# Patient Record
Sex: MALE | Race: WHITE | Employment: UNEMPLOYED | URBAN - METROPOLITAN AREA
[De-identification: names, ages, dates, MRNs, and addresses within clinical notes are randomized per-mention and may not be internally consistent; named-entity substitution may affect disease eponyms.]

---

## 2020-01-04 ENCOUNTER — HOSPITAL ENCOUNTER (EMERGENCY)
Age: 20
Discharge: HOME | End: 2020-01-04
Attending: EMERGENCY MEDICINE
Payer: COMMERCIAL

## 2020-01-04 ENCOUNTER — APPOINTMENT (OUTPATIENT)
Dept: GENERAL RADIOLOGY | Age: 20
End: 2020-01-04
Payer: COMMERCIAL

## 2020-01-04 VITALS
HEART RATE: 70 BPM | HEIGHT: 69 IN | BODY MASS INDEX: 19.26 KG/M2 | WEIGHT: 130 LBS | SYSTOLIC BLOOD PRESSURE: 134 MMHG | DIASTOLIC BLOOD PRESSURE: 83 MMHG | RESPIRATION RATE: 15 BRPM | TEMPERATURE: 99.8 F | OXYGEN SATURATION: 99 %

## 2020-01-04 LAB
EKG ATRIAL RATE: 74 BPM
EKG DIAGNOSIS: NORMAL
EKG P AXIS: 77 DEGREES
EKG P-R INTERVAL: 132 MS
EKG Q-T INTERVAL: 396 MS
EKG QRS DURATION: 140 MS
EKG QTC CALCULATION (BAZETT): 439 MS
EKG R AXIS: 36 DEGREES
EKG T AXIS: 41 DEGREES
EKG VENTRICULAR RATE: 74 BPM

## 2020-01-04 PROCEDURE — 99285 EMERGENCY DEPT VISIT HI MDM: CPT

## 2020-01-04 PROCEDURE — 6370000000 HC RX 637 (ALT 250 FOR IP): Performed by: EMERGENCY MEDICINE

## 2020-01-04 PROCEDURE — 71046 X-RAY EXAM CHEST 2 VIEWS: CPT

## 2020-01-04 PROCEDURE — 93010 ELECTROCARDIOGRAM REPORT: CPT | Performed by: INTERNAL MEDICINE

## 2020-01-04 PROCEDURE — 93005 ELECTROCARDIOGRAM TRACING: CPT | Performed by: EMERGENCY MEDICINE

## 2020-01-04 PROCEDURE — 94640 AIRWAY INHALATION TREATMENT: CPT

## 2020-01-04 RX ORDER — IPRATROPIUM BROMIDE AND ALBUTEROL SULFATE 2.5; .5 MG/3ML; MG/3ML
1 SOLUTION RESPIRATORY (INHALATION) ONCE
Status: COMPLETED | OUTPATIENT
Start: 2020-01-04 | End: 2020-01-04

## 2020-01-04 RX ORDER — ALBUTEROL SULFATE 90 UG/1
2 AEROSOL, METERED RESPIRATORY (INHALATION) EVERY 4 HOURS PRN
Qty: 1 INHALER | Refills: 0 | Status: SHIPPED | OUTPATIENT
Start: 2020-01-04

## 2020-01-04 RX ORDER — PREDNISONE 20 MG/1
60 TABLET ORAL ONCE
Status: COMPLETED | OUTPATIENT
Start: 2020-01-04 | End: 2020-01-04

## 2020-01-04 RX ORDER — PREDNISONE 20 MG/1
60 TABLET ORAL DAILY
Qty: 12 TABLET | Refills: 0 | Status: SHIPPED | OUTPATIENT
Start: 2020-01-04 | End: 2020-01-08

## 2020-01-04 RX ADMIN — IPRATROPIUM BROMIDE AND ALBUTEROL SULFATE 1 AMPULE: .5; 3 SOLUTION RESPIRATORY (INHALATION) at 03:56

## 2020-01-04 RX ADMIN — PREDNISONE 60 MG: 20 TABLET ORAL at 03:40

## 2020-01-04 ASSESSMENT — ENCOUNTER SYMPTOMS
COLOR CHANGE: 0
TROUBLE SWALLOWING: 0
VOICE CHANGE: 0
PHOTOPHOBIA: 0
SHORTNESS OF BREATH: 1
ABDOMINAL PAIN: 0
STRIDOR: 0
BACK PAIN: 0
VOMITING: 0
FACIAL SWELLING: 0
WHEEZING: 0
NAUSEA: 0
BLOOD IN STOOL: 0

## 2020-01-04 ASSESSMENT — PAIN SCALES - GENERAL: PAINLEVEL_OUTOF10: 2

## 2020-01-04 ASSESSMENT — PAIN DESCRIPTION - ORIENTATION: ORIENTATION: LEFT

## 2020-01-04 ASSESSMENT — PAIN DESCRIPTION - LOCATION: LOCATION: CHEST

## 2020-01-04 NOTE — ED PROVIDER NOTES
difficulty. Psychiatric/Behavioral: Negative for self-injury and suicidal ideas. Except as noted above the remainder of the review of systems was reviewed and negative. PAST MEDICAL HISTORY     Past Medical History:   Diagnosis Date    Asthma     Bundle branch block, right          SURGICAL HISTORY     History reviewed. No pertinent surgical history. CURRENT MEDICATIONS       Previous Medications    No medications on file       ALLERGIES     Patient has no known allergies. FAMILY HISTORY     History reviewed. No pertinent family history.        SOCIAL HISTORY       Social History     Socioeconomic History    Marital status: Single     Spouse name: None    Number of children: None    Years of education: None    Highest education level: None   Occupational History    None   Social Needs    Financial resource strain: None    Food insecurity:     Worry: None     Inability: None    Transportation needs:     Medical: None     Non-medical: None   Tobacco Use    Smoking status: Current Every Day Smoker     Packs/day: 0.50     Types: Cigarettes    Smokeless tobacco: Never Used   Substance and Sexual Activity    Alcohol use: No    Drug use: No    Sexual activity: Yes     Partners: Female   Lifestyle    Physical activity:     Days per week: None     Minutes per session: None    Stress: None   Relationships    Social connections:     Talks on phone: None     Gets together: None     Attends Denominational service: None     Active member of club or organization: None     Attends meetings of clubs or organizations: None     Relationship status: None    Intimate partner violence:     Fear of current or ex partner: None     Emotionally abused: None     Physically abused: None     Forced sexual activity: None   Other Topics Concern    None   Social History Narrative    None         PHYSICAL EXAM    (up to 7 for level 4, 8 or more for level 5)     ED Triage Vitals [01/04/20 0303]   BP Temp Temp available at the time of this note:    XR CHEST STANDARD (2 VW)   Final Result   No acute disease. LABS:  Labs Reviewed   CBC WITH AUTO DIFFERENTIAL   BASIC METABOLIC PANEL   D-DIMER, QUANTITATIVE       All otherlabs were within normal range or not returned as of this dictation. EMERGENCY DEPARTMENT COURSE and DIFFERENTIAL DIAGNOSIS/MDM:   Vitals:    Vitals:    01/04/20 0303   BP: (!) 153/90   Pulse: 120   Resp: 16   Temp: 99.8 °F (37.7 °C)   TempSrc: Oral   SpO2: 99%   Weight: 130 lb (59 kg)   Height: 5' 9\" (1.753 m)         MDM  Patient is tachycardic and does report pleuritic chest pain therefore I have discussed pulmonary embolism (blood clot in the lungs) as a concern with the patient and his mother. I have strongly recommended d-dimer and then CTA chest if the d-dimer is concerning. The patient outright refuses any blood draws and he is scared of needles, only wants a chest x-ray, and refuses any blood work. I had a very renato conversation with him about the risks, benefits, alternatives of this pursuing a work-up for pulmonary embolism and I feel the patient adequately understands the risks, benefits, alternatives. While the patient's mother does want him to obtain the blood work she does understand that he is an adult, is sober, and understands the risks, and benefits, and therefore is able to make his own decisions. The patient's chest x-ray does not show any acute cardiopulmonary pathology and he report substantial improvement after receiving prednisone and 1 breathing treatment. Repeat lung exam shows improved air movement bilaterally and the patient states that he milligrams any pain when taking a deep breath. I have again asked him to undergo work-up for pulmonary embolism and the patient again refuses. I also explained that this test would reveal any damage to his heart however the patient continues to refuse any blood work.   I feel he is appropriate for discharge home with

## 2020-01-04 NOTE — ED NOTES
Patient re educated on risk and benefits of blood draw and need for IV to do additional testing. Patient declined. Patient reports he does not want a blood draw. Patient is alert and oriented. Mother is at bedside and encouraged patient to have testing.  Patient verbalized understanding of discharge instructions and follow up care with medication administration     Jennifer Mora RN  01/04/20 1044 01-Jan-2020 12:40

## 2020-01-07 ENCOUNTER — OFFICE VISIT (OUTPATIENT)
Dept: CARDIOLOGY CLINIC | Age: 20
End: 2020-01-07
Payer: COMMERCIAL

## 2020-01-07 VITALS
HEART RATE: 82 BPM | DIASTOLIC BLOOD PRESSURE: 72 MMHG | HEIGHT: 69 IN | OXYGEN SATURATION: 98 % | BODY MASS INDEX: 17.89 KG/M2 | SYSTOLIC BLOOD PRESSURE: 118 MMHG | WEIGHT: 120.8 LBS

## 2020-01-07 PROBLEM — I45.10 RIGHT BUNDLE BRANCH BLOCK: Status: ACTIVE | Noted: 2020-01-07

## 2020-01-07 PROBLEM — R06.02 SHORTNESS OF BREATH: Status: ACTIVE | Noted: 2020-01-07

## 2020-01-07 PROBLEM — Z72.0 TOBACCO ABUSE: Status: ACTIVE | Noted: 2020-01-07

## 2020-01-07 PROBLEM — R07.2 PRECORDIAL PAIN: Status: ACTIVE | Noted: 2020-01-07

## 2020-01-07 PROCEDURE — 99244 OFF/OP CNSLTJ NEW/EST MOD 40: CPT | Performed by: INTERNAL MEDICINE

## 2020-01-07 PROCEDURE — G8484 FLU IMMUNIZE NO ADMIN: HCPCS | Performed by: INTERNAL MEDICINE

## 2020-01-07 PROCEDURE — G8427 DOCREV CUR MEDS BY ELIG CLIN: HCPCS | Performed by: INTERNAL MEDICINE

## 2020-01-07 PROCEDURE — G8419 CALC BMI OUT NRM PARAM NOF/U: HCPCS | Performed by: INTERNAL MEDICINE

## 2020-01-07 NOTE — PROGRESS NOTES
Sig Start Date End Date Taking? Authorizing Provider   predniSONE (DELTASONE) 20 MG tablet Take 3 tablets by mouth daily for 4 days 1/4/20 1/8/20 Yes Nicole Vazquez MD   albuterol sulfate HFA (PROVENTIL HFA) 108 (90 Base) MCG/ACT inhaler Inhale 2 puffs into the lungs every 4 hours as needed for Wheezing or Shortness of Breath (Space out to every 6 hours as symptoms improve) Space out to every 6 hours as symptoms improve. 1/4/20  Yes Nicole Vazquez MD       In-patient schedule medications:        Infusion Medications: Allergies:  Patient has no known allergies. Review of Systems:   All 14 point review of symptoms completed. Pertinent positives identified in the HPI, all other review of symptoms findings as below.      Review of Systems - History obtained from the patient  General ROS: negative for - chills, fever or night sweats  Psychological ROS: negative for - disorientation or hallucinations  Ophthalmic ROS: negative for - dry eyes, eye pain or loss of vision  ENT ROS: negative for - nasal discharge or sore throat  Allergy and Immunology ROS: negative for - hives or itchy/watery eyes  Hematological and Lymphatic ROS: negative for - jaundice or night sweats  Endocrine ROS: negative for - mood swings or temperature intolerance  Breast ROS: deferred  Respiratory ROS: negative for - hemoptysis or stridor  Gastrointestinal ROS: no abdominal pain, change in bowel habits, or black or bloody stools  Genito-Urinary ROS: no dysuria, trouble voiding, or hematuria  Musculoskeletal ROS: negative for - gait disturbance, joint pain or joint stiffness  Neurological ROS: negative for - seizures or speech problems  Dermatological ROS: negative for - rash or skin lesion changes      Physical Examination:    /72   Pulse 82   Ht 5' 9\" (1.753 m)   Wt 120 lb 12.8 oz (54.8 kg)   SpO2 98%   BMI 17.84 kg/m²   /72   Pulse 82   Ht 5' 9\" (1.753 m)   Wt 120 lb 12.8 oz (54.8 kg)   SpO2 98%   BMI 17.84 kg/m²    Weight: 120 lb 12.8 oz (54.8 kg)     Wt Readings from Last 3 Encounters:   01/07/20 120 lb 12.8 oz (54.8 kg)   01/04/20 130 lb (59 kg)   10/08/17 130 lb (59 kg) (17 %, Z= -0.94)*     * Growth percentiles are based on [facility] (Boys, 2-20 Years) data. No intake or output data in the 24 hours ending 01/07/20 1324    General Appearance:  Alert, cooperative, no distress, appears stated age   Head:  Normocephalic, without obvious abnormality, atraumatic   Eyes:  PERRL, conjunctiva/corneas clear       Nose: Nares normal, no drainage or sinus tenderness   Throat: Lips, mucosa, and tongue normal   Neck: Supple, symmetrical, trachea midline, no adenopathy, thyroid: not enlarged, symmetric, no tenderness/mass/nodules, no carotid bruit or JVD       Lungs:   Clear to auscultation bilaterally, respirations unlabored   Chest Wall:  No tenderness or deformity   Heart:  Regular rhythm and normal rate; S1, S2 are normal; no murmur noted; no rub or gallop   Abdomen:   Soft, non-tender, bowel sounds active all four quadrants,  no masses, no organomegaly           Extremities: Extremities normal, atraumatic, no cyanosis or edema   Pulses: 2+ and symmetric   Skin: Skin color, texture, turgor normal, no rashes or lesions   Pysch: Normal mood and affect   Neurologic: Normal gross motor and sensory exam.         Labs  No results for input(s): WBC, HGB, HCT, MCV, PLT in the last 72 hours. No results for input(s): CREATININE, BUN, NA, K, CL, CO2 in the last 72 hours. No results for input(s): INR, PROTIME in the last 72 hours. No results for input(s): TROPONINI in the last 72 hours. Invalid input(s): PRO-BNP  No results for input(s): CHOL, HDL in the last 72 hours. Invalid input(s): LDL, TG      Imaging:  I have reviewed the below testing personally and my interpretation is below.   EKG:  CXR:      Assessment:  20 y.o. patient with:  Problem List Items Addressed This Visit     Precordial pain    Shortness of breath    Right

## 2020-01-07 NOTE — PATIENT INSTRUCTIONS
Plan:  1. An echocardiogram will be ordered. This will allow review of the patient's left ventricular function and determine any valve abnormalities. The pericardium and other structures will also be evaluated. 2. Establish care with a primary care physician. 3. Referral to Pulmonology. 4. Importance of smoking cessation discussed. 5. Follow up with me after testing.

## 2020-01-16 ENCOUNTER — HOSPITAL ENCOUNTER (OUTPATIENT)
Dept: NON INVASIVE DIAGNOSTICS | Age: 20
Discharge: HOME | End: 2020-01-16
Payer: COMMERCIAL

## 2020-01-16 LAB
LV EF: 55 %
LVEF MODALITY: NORMAL

## 2020-01-16 PROCEDURE — 93306 TTE W/DOPPLER COMPLETE: CPT

## 2020-01-24 ENCOUNTER — TELEPHONE (OUTPATIENT)
Dept: CARDIOLOGY CLINIC | Age: 20
End: 2020-01-24

## 2020-01-24 NOTE — TELEPHONE ENCOUNTER
----- Message from Byron Faith MD sent at 1/24/2020 12:06 PM EST -----  The test is normal. Please call the patient and inform them of the normal result.

## 2020-01-27 ENCOUNTER — OFFICE VISIT (OUTPATIENT)
Dept: PULMONOLOGY | Age: 20
End: 2020-01-27
Payer: COMMERCIAL

## 2020-01-27 VITALS
BODY MASS INDEX: 18.07 KG/M2 | WEIGHT: 122 LBS | TEMPERATURE: 98 F | DIASTOLIC BLOOD PRESSURE: 90 MMHG | HEIGHT: 69 IN | RESPIRATION RATE: 16 BRPM | SYSTOLIC BLOOD PRESSURE: 138 MMHG | HEART RATE: 118 BPM | OXYGEN SATURATION: 98 %

## 2020-01-27 PROCEDURE — G8427 DOCREV CUR MEDS BY ELIG CLIN: HCPCS | Performed by: INTERNAL MEDICINE

## 2020-01-27 PROCEDURE — G8484 FLU IMMUNIZE NO ADMIN: HCPCS | Performed by: INTERNAL MEDICINE

## 2020-01-27 PROCEDURE — 99244 OFF/OP CNSLTJ NEW/EST MOD 40: CPT | Performed by: INTERNAL MEDICINE

## 2020-01-27 PROCEDURE — G8419 CALC BMI OUT NRM PARAM NOF/U: HCPCS | Performed by: INTERNAL MEDICINE

## 2020-01-27 RX ORDER — ALBUTEROL SULFATE 90 UG/1
2 AEROSOL, METERED RESPIRATORY (INHALATION) EVERY 4 HOURS PRN
Qty: 1 INHALER | Refills: 5 | Status: SHIPPED | OUTPATIENT
Start: 2020-01-27 | End: 2021-01-26

## 2020-01-28 ENCOUNTER — TELEPHONE (OUTPATIENT)
Dept: PULMONOLOGY | Age: 20
End: 2020-01-28

## 2020-01-30 NOTE — TELEPHONE ENCOUNTER
Insurance denied Vonna Searing but Advair is preferred. New script for Lizzie Salazarus is pending if you agree. OV 1/27/20:    ASSESSMENT:  · Shortness of breath   · Mild intermittent asthma, history of childhood asthma  · Ongoing tobacco abuse     PLAN:  · Full PFT and see me in 4-6 weeks  · Advair 250  · Albuterol PRN  · I did not see any findings on physical exam suggesting neurologic or vascular compromise of the left upper extremity.   I encouraged him to schedule with a regular PCP and/or neurologist if he has any additional concerns.

## 2020-01-30 NOTE — TELEPHONE ENCOUNTER
Your request has been denied   Unable to approve Sherlean Breslow powder for inhalation 250-50 mcg/dose due to unmet criteria (2) as outlined in the plan guideline below.  Member must try drug alternatives for a specific time frame per criteria number [2]: Dulera, Symbicort, Advair

## 2020-03-17 ENCOUNTER — TELEPHONE (OUTPATIENT)
Dept: PULMONOLOGY | Age: 20
End: 2020-03-17

## 2020-10-11 ENCOUNTER — HOSPITAL ENCOUNTER (EMERGENCY)
Age: 20
Discharge: HOME | End: 2020-10-11
Attending: EMERGENCY MEDICINE
Payer: COMMERCIAL

## 2020-10-11 VITALS
WEIGHT: 125 LBS | HEART RATE: 96 BPM | TEMPERATURE: 98.2 F | HEIGHT: 69 IN | DIASTOLIC BLOOD PRESSURE: 87 MMHG | RESPIRATION RATE: 16 BRPM | SYSTOLIC BLOOD PRESSURE: 135 MMHG | BODY MASS INDEX: 18.51 KG/M2 | OXYGEN SATURATION: 97 %

## 2020-10-11 PROCEDURE — 6370000000 HC RX 637 (ALT 250 FOR IP)

## 2020-10-11 PROCEDURE — 99282 EMERGENCY DEPT VISIT SF MDM: CPT

## 2020-10-11 RX ORDER — CHLORHEXIDINE GLUCONATE 0.12 MG/ML
15 RINSE ORAL 2 TIMES DAILY
Qty: 420 ML | Refills: 0 | Status: SHIPPED | OUTPATIENT
Start: 2020-10-11 | End: 2020-10-25

## 2020-10-11 RX ORDER — IBUPROFEN 600 MG/1
600 TABLET ORAL ONCE
Status: COMPLETED | OUTPATIENT
Start: 2020-10-11 | End: 2020-10-11

## 2020-10-11 RX ORDER — IBUPROFEN 600 MG/1
TABLET ORAL
Status: COMPLETED
Start: 2020-10-11 | End: 2020-10-11

## 2020-10-11 RX ORDER — PENICILLIN V POTASSIUM 500 MG/1
500 TABLET ORAL 4 TIMES DAILY
Qty: 28 TABLET | Refills: 0 | Status: SHIPPED | OUTPATIENT
Start: 2020-10-11 | End: 2020-10-18

## 2020-10-11 RX ADMIN — IBUPROFEN 600 MG: 600 TABLET ORAL at 10:42

## 2020-10-11 RX ADMIN — IBUPROFEN 600 MG: 600 TABLET, FILM COATED ORAL at 10:42

## 2020-10-11 ASSESSMENT — PAIN DESCRIPTION - ORIENTATION: ORIENTATION: LEFT;LOWER

## 2020-10-11 ASSESSMENT — PAIN DESCRIPTION - PAIN TYPE
TYPE: CHRONIC PAIN
TYPE: ACUTE PAIN

## 2020-10-11 ASSESSMENT — PAIN SCALES - GENERAL
PAINLEVEL_OUTOF10: 7

## 2020-10-11 ASSESSMENT — PAIN DESCRIPTION - LOCATION
LOCATION: TEETH
LOCATION: TEETH

## 2020-10-11 ASSESSMENT — PAIN DESCRIPTION - DESCRIPTORS: DESCRIPTORS: ACHING

## 2020-10-11 ASSESSMENT — PAIN DESCRIPTION - FREQUENCY: FREQUENCY: INTERMITTENT

## 2020-10-11 NOTE — ED PROVIDER NOTES
Attends Taoism service: Not on file     Active member of club or organization: Not on file     Attends meetings of clubs or organizations: Not on file     Relationship status: Not on file    Intimate partner violence     Fear of current or ex partner: Not on file     Emotionally abused: Not on file     Physically abused: Not on file     Forced sexual activity: Not on file   Other Topics Concern    Not on file   Social History Narrative    Not on file     No current facility-administered medications for this encounter. Current Outpatient Medications   Medication Sig Dispense Refill    penicillin v potassium (VEETID) 500 MG tablet Take 1 tablet by mouth 4 times daily for 7 days 28 tablet 0    chlorhexidine (PERIDEX) 0.12 % solution Take 15 mLs by mouth 2 times daily for 14 days 420 mL 0    ADVAIR DISKUS 250-50 MCG/DOSE AEPB Inhale 1 puff into the lungs 2 times daily 60 each 5    albuterol sulfate HFA (PROVENTIL HFA) 108 (90 Base) MCG/ACT inhaler Inhale 2 puffs into the lungs every 4 hours as needed for Wheezing 1 Inhaler 5    albuterol sulfate HFA (PROVENTIL HFA) 108 (90 Base) MCG/ACT inhaler Inhale 2 puffs into the lungs every 4 hours as needed for Wheezing or Shortness of Breath (Space out to every 6 hours as symptoms improve) Space out to every 6 hours as symptoms improve. 1 Inhaler 0     No Known Allergies    REVIEW OF SYSTEMS  10 systems reviewed, pertinent positives and negatives per HPI, otherwise noted to be negative. PHYSICAL EXAM  ED Triage Vitals [10/11/20 1021]   Enc Vitals Group      /87      Pulse 115      Resp 16      Temp 98.2 °F (36.8 °C)      Temp Source Oral      SpO2 97 %      Weight 125 lb (56.7 kg)      Height 5' 9\" (1.753 m)      Head Circumference       Peak Flow       Pain Score       Pain Loc       Pain Edu? Excl. in [address]? General appearance: Awake and alert. Cooperative. No acute distress. Nontoxic. HENT: Normocephalic. Atraumatic.  Mucous membranes are ED course, the patient was given:  Medications   ibuprofen (ADVIL;MOTRIN) tablet 600 mg (600 mg Oral Given 10/11/20 1042)        CLINICAL IMPRESSION  1. Pain, dental        DISPOSITION   Decision To Discharge 10/11/2020 10:43:44 AM    Condition: stable    Shantal Zhou MD    Note: This chart was created using voice recognition dictation software. Efforts were made by me to ensure accuracy, however some errors may be present due to limitations of this technology and occasionally words are not transcribed correctly.         Shantal Zhou MD  10/11/20 1016

## 2023-08-18 ENCOUNTER — OFFICE VISIT (OUTPATIENT)
Dept: CARDIOLOGY CLINIC | Age: 23
End: 2023-08-18

## 2023-08-18 VITALS
HEART RATE: 80 BPM | SYSTOLIC BLOOD PRESSURE: 136 MMHG | OXYGEN SATURATION: 98 % | DIASTOLIC BLOOD PRESSURE: 82 MMHG | BODY MASS INDEX: 20.47 KG/M2 | HEIGHT: 69 IN | WEIGHT: 138.2 LBS

## 2023-08-18 DIAGNOSIS — R07.89 ATYPICAL CHEST PAIN: ICD-10-CM

## 2023-08-18 DIAGNOSIS — D17.79 LIPOMA OF OTHER SPECIFIED SITES: ICD-10-CM

## 2023-08-18 DIAGNOSIS — R06.02 SHORTNESS OF BREATH: ICD-10-CM

## 2023-08-18 DIAGNOSIS — R42 LIGHTHEADEDNESS: ICD-10-CM

## 2023-08-18 DIAGNOSIS — Z00.00 ROUTINE HEALTH MAINTENANCE: ICD-10-CM

## 2023-08-18 DIAGNOSIS — I45.10 RBBB: ICD-10-CM

## 2023-08-18 DIAGNOSIS — R07.9 CHEST PAIN, UNSPECIFIED TYPE: ICD-10-CM

## 2023-08-18 DIAGNOSIS — R07.89 ATYPICAL CHEST PAIN: Primary | ICD-10-CM

## 2023-08-18 DIAGNOSIS — J45.909 UNCOMPLICATED ASTHMA, UNSPECIFIED ASTHMA SEVERITY, UNSPECIFIED WHETHER PERSISTENT: ICD-10-CM

## 2023-08-18 DIAGNOSIS — Z72.0 TOBACCO ABUSE: ICD-10-CM

## 2023-08-18 LAB
BASOPHILS # BLD: 0 K/UL (ref 0–0.2)
BASOPHILS NFR BLD: 0.3 %
DEPRECATED RDW RBC AUTO: 13.3 % (ref 12.4–15.4)
EOSINOPHIL # BLD: 0 K/UL (ref 0–0.6)
EOSINOPHIL NFR BLD: 0.5 %
HCT VFR BLD AUTO: 47.5 % (ref 40.5–52.5)
HGB BLD-MCNC: 16.3 G/DL (ref 13.5–17.5)
LYMPHOCYTES # BLD: 2.3 K/UL (ref 1–5.1)
LYMPHOCYTES NFR BLD: 25 %
MCH RBC QN AUTO: 31 PG (ref 26–34)
MCHC RBC AUTO-ENTMCNC: 34.3 G/DL (ref 31–36)
MCV RBC AUTO: 90.6 FL (ref 80–100)
MONOCYTES # BLD: 0.5 K/UL (ref 0–1.3)
MONOCYTES NFR BLD: 5.2 %
NEUTROPHILS # BLD: 6.3 K/UL (ref 1.7–7.7)
NEUTROPHILS NFR BLD: 69 %
PLATELET # BLD AUTO: 190 K/UL (ref 135–450)
PMV BLD AUTO: 9 FL (ref 5–10.5)
RBC # BLD AUTO: 5.24 M/UL (ref 4.2–5.9)
WBC # BLD AUTO: 9.2 K/UL (ref 4–11)

## 2023-08-18 ASSESSMENT — ENCOUNTER SYMPTOMS
SHORTNESS OF BREATH: 0
VOMITING: 0
CONSTIPATION: 0
PHOTOPHOBIA: 0
SORE THROAT: 0
COUGH: 0
NAUSEA: 0
EYE PAIN: 0
ABDOMINAL PAIN: 0
DIARRHEA: 0
BLOOD IN STOOL: 0
RHINORRHEA: 0

## 2023-08-18 NOTE — PROGRESS NOTES
[address]   Cardiovascular Evaluation          PCP:  Aileen Arceo    Reason for evaluation:   Chest pain     Subjective:   History of present illness on initial date of evaluation:  Alicia Cota is a 24 y.o. male with history of right bundle branch block and asthma referred for further evaluation of chest pain. The patient reports that approximately 2 weeks ago while doing a change in work, he developed pressure in his left side that radiated into his armpit and left arm. He denies any associated shortness of breath with this, but says that since that time he has continued to have episodes of the pain on and off. These episodes typically last for 10-20 seconds and can occur at any time without any specific trigger. He does note that when he is anxious, the episodes seem to be worse. He also feels like his left arm is weak. During the initial episode, he says that he felt lightheaded while driving home from work. He denies any palpitations. He says that he lifts weights and walks regularly and does not seem to have any symptoms with these activities. He was reportedly diagnosed with asthma in infancy and last saw pulmonology in 2020. At that time, PFTs were ordered, but it does not appear that the patient ever had the studies completed. He smoked cigarettes for 4 years and says that he quit after his chest pain started approximately 2 weeks ago. He denies any history of heavy alcohol use or illicit drug use. There is no known family history of heart disease. Past Medical History:   has a past medical history of Asthma and Bundle branch block, right. Surgical History:   has no past surgical history on file. Social History:   reports that he has quit smoking. His smoking use included cigarettes. He has a 1.50 pack-year smoking history.  He has never used smokeless tobacco. He reports that he does not drink alcohol and does not use

## 2023-08-18 NOTE — PATIENT INSTRUCTIONS
Establish with a primary care provider. Follow up with them regarding Lipoma on your back   Lab work due - CMP, CBC, Fasting lipids and TSH. You can go to any [facility] facility at anytime. You must be fasting. I recommend Echocardiogram for complete assessment of cardiac structure and function. Call [phone] to schedule this test   Will arrange for GXT nuclear SPECT stress test for further cardiac risk stratification. OK to convert to [facility] if does not reach target heart rate with exercise. Call [phone] to schedule this test   PFTs. Call [phone] to schedule this test  Follow up with Pulmonologist. Call [phone] to make appt.    Follow up with me 3 months

## 2023-08-19 LAB
ALBUMIN SERPL-MCNC: 5 G/DL (ref 3.4–5)
ALBUMIN/GLOB SERPL: 1.9 {RATIO} (ref 1.1–2.2)
ALP SERPL-CCNC: 55 U/L (ref 40–129)
ALT SERPL-CCNC: 19 U/L (ref 10–40)
ANION GAP SERPL CALCULATED.3IONS-SCNC: 15 MMOL/L (ref 3–16)
AST SERPL-CCNC: 14 U/L (ref 15–37)
BILIRUB SERPL-MCNC: 0.5 MG/DL (ref 0–1)
BUN SERPL-MCNC: 11 MG/DL (ref 7–20)
CALCIUM SERPL-MCNC: 9.7 MG/DL (ref 8.3–10.6)
CHLORIDE SERPL-SCNC: 103 MMOL/L (ref 99–110)
CHOLEST SERPL-MCNC: 162 MG/DL (ref 0–199)
CO2 SERPL-SCNC: 23 MMOL/L (ref 21–32)
CREAT SERPL-MCNC: 1 MG/DL (ref 0.9–1.3)
GFR SERPLBLD CREATININE-BSD FMLA CKD-EPI: >60 ML/MIN/{1.73_M2}
GLUCOSE SERPL-MCNC: 106 MG/DL (ref 70–99)
HDLC SERPL-MCNC: 46 MG/DL (ref 40–60)
LDL CHOLESTEROL CALCULATED: 101 MG/DL
MAGNESIUM SERPL-MCNC: 2.2 MG/DL (ref 1.8–2.4)
POTASSIUM SERPL-SCNC: 3.8 MMOL/L (ref 3.5–5.1)
PROT SERPL-MCNC: 7.7 G/DL (ref 6.4–8.2)
SODIUM SERPL-SCNC: 141 MMOL/L (ref 136–145)
TRIGL SERPL-MCNC: 75 MG/DL (ref 0–150)
TSH SERPL DL<=0.005 MIU/L-ACNC: 4.18 UIU/ML (ref 0.27–4.2)
VLDLC SERPL CALC-MCNC: 15 MG/DL

## 2023-08-21 ENCOUNTER — TELEPHONE (OUTPATIENT)
Dept: CARDIOLOGY CLINIC | Age: 23
End: 2023-08-21

## 2023-08-21 DIAGNOSIS — R06.02 SHORTNESS OF BREATH: Primary | ICD-10-CM

## 2023-08-21 NOTE — TELEPHONE ENCOUNTER
Central scheduling called for order to be hospital or ancillary performed. Scheduling asking if pr needs both echo and echo exercise stress test? Or was this supposed to be ex nuc stress test? Please advise.

## 2023-08-22 ENCOUNTER — TELEPHONE (OUTPATIENT)
Dept: CARDIOLOGY CLINIC | Age: 23
End: 2023-08-22

## 2023-08-22 NOTE — TELEPHONE ENCOUNTER
----- Message from Serenity Cuellar DO sent at 8/22/2023  8:26 AM EDT -----  Please let patient know that his blood counts are WNL, TSH is WNL, kidney function and electrolytes are WNL, LDL (bad cholesterol) was very mildly elevated, and glucose was mildly elevated if patient was truly fasting when labs were obtained. Recommend proceeding with TTE, exercise stress echo, and PFTs as previously ordered. Thanks. He also needs to establish with a PCP for routine health maintenance.

## 2023-08-22 NOTE — TELEPHONE ENCOUNTER
Called and spoke with pt mom per hipaa form, relayed message. She v/u. She wanted to verify that pt was fasting and will let us know.

## 2023-08-24 NOTE — TELEPHONE ENCOUNTER
Called patient. Left message to call back and go over result message. If patient callas back. Please clarify if patient was fasting for lab work? Also ask if they plan to make appts for Echo and exercise stress echo as recommended by [facility] at office visit. Number for central scheduling [phone].  Thanks

## 2023-08-25 NOTE — TELEPHONE ENCOUNTER
Called and spoke with pt mom, pt believes he had tea with sugar in it prior to labs.  Would you like pt to repeat fasting lipid test?

## 2023-08-25 NOTE — TELEPHONE ENCOUNTER
No need to repeat labs. If patient was not fasting when labs were collected, glucose level would not be considered abnormal in that range.

## 2023-09-13 ENCOUNTER — TELEPHONE (OUTPATIENT)
Dept: PULMONOLOGY | Age: 23
End: 2023-09-13

## 2023-09-13 NOTE — TELEPHONE ENCOUNTER
Patient did not show for New Patient appt  with Vianney on 9/13/23. This is patient's first no show. Patient did not reschedule.   LVM to R/S

## 2024-03-04 ENCOUNTER — INITIAL CONSULT (OUTPATIENT)
Dept: SURGERY | Age: 24
End: 2024-03-04
Payer: COMMERCIAL

## 2024-03-04 VITALS
WEIGHT: 139.9 LBS | BODY MASS INDEX: 20.72 KG/M2 | DIASTOLIC BLOOD PRESSURE: 80 MMHG | HEIGHT: 69 IN | SYSTOLIC BLOOD PRESSURE: 148 MMHG

## 2024-03-04 DIAGNOSIS — L02.91 SOFT TISSUE ABSCESS: Primary | ICD-10-CM

## 2024-03-04 DIAGNOSIS — L72.3 SEBACEOUS CYST: ICD-10-CM

## 2024-03-04 PROCEDURE — G8484 FLU IMMUNIZE NO ADMIN: HCPCS | Performed by: SURGERY

## 2024-03-04 PROCEDURE — 1036F TOBACCO NON-USER: CPT | Performed by: SURGERY

## 2024-03-04 PROCEDURE — 10061 I&D ABSCESS COMP/MULTIPLE: CPT | Performed by: SURGERY

## 2024-03-04 PROCEDURE — G8427 DOCREV CUR MEDS BY ELIG CLIN: HCPCS | Performed by: SURGERY

## 2024-03-04 PROCEDURE — 99203 OFFICE O/P NEW LOW 30 MIN: CPT | Performed by: SURGERY

## 2024-03-04 PROCEDURE — G8420 CALC BMI NORM PARAMETERS: HCPCS | Performed by: SURGERY

## 2024-03-04 RX ORDER — SULFAMETHOXAZOLE AND TRIMETHOPRIM 800; 160 MG/1; MG/1
TABLET ORAL
COMMUNITY
Start: 2024-02-26

## 2024-03-04 RX ORDER — CEPHALEXIN 500 MG
1 CAPSULE ORAL
COMMUNITY
Start: 2024-02-26 | End: 2024-03-06

## 2024-03-12 NOTE — PROGRESS NOTES
[facility]    HPI:  Patient is 23 y.o. year old male seen at request of Brian Castaneda DO.  He reports lump located on  back .  There has not been any drainage.  There has been previous treatment with antibiotics.  There is not previous history of similar.  There has not been fever.  The area has enlarged and is more painful.    Past Medical History:   Diagnosis Date    Asthma     Bundle branch block, right        PSH:  None    Current Outpatient Medications on File Prior to Visit   Medication Sig Dispense Refill    BACTRIM -160 MG per tablet Take by mouth      ADVAIR DISKUS 250-50 MCG/DOSE AEPB Inhale 1 puff into the lungs 2 times daily (Patient not taking: Reported on 8/18/2023) 60 each 5    albuterol sulfate HFA (PROVENTIL HFA) 108 (90 Base) MCG/ACT inhaler Inhale 2 puffs into the lungs every 4 hours as needed for Wheezing or Shortness of Breath (Space out to every 6 hours as symptoms improve) Space out to every 6 hours as symptoms improve. (Patient not taking: Reported on 8/18/2023) 1 Inhaler 0     No current facility-administered medications on file prior to visit.       No Known Allergies    Social History     Socioeconomic History    Marital status: Single     Spouse name: Not on file    Number of children: Not on file    Years of education: Not on file    Highest education level: Not on file   Occupational History    Not on file   Tobacco Use    Smoking status: Former     Current packs/day: 0.50     Average packs/day: 0.5 packs/day for 3.0 years (1.5 ttl pk-yrs)     Types: Cigarettes     Passive exposure: Past    Smokeless tobacco: Never    Tobacco comments:     1 pack in 3 days   Vaping Use    Vaping Use: Never used   Substance and Sexual Activity    Alcohol use: No    Drug use: No    Sexual activity: Yes     Partners: Female   Other Topics Concern    Not on file   Social History Narrative    Not on file     Social Determinants of Health     Financial Resource Strain: Not on file   Food

## 2024-03-13 ENCOUNTER — TELEPHONE (OUTPATIENT)
Dept: SURGERY | Age: 24
End: 2024-03-13

## 2024-03-13 NOTE — TELEPHONE ENCOUNTER
Mother is calling to inform the office that Dony has 103 fever yesterday  and its 100 today. Has not taken all his antibiotics. Site is warm to the touch, its still bandaged.  They made an appointment with a primary care doctor for latre is afternoon.    Call back [phone]  kathryn Bernal    Please advise.

## 2024-03-13 NOTE — TELEPHONE ENCOUNTER
I spoke with mom. She states she has not seen the wound/abscess in 3-4 days, but it looked really good when she last saw it. She states he is changing the dressing daily. He did not finish his Kelflex, and has 13-14 pills left. She thinks he finished the Bactrim. She states he started with a fever yesterday and slept with his window open last night, so his room was freezing cold this morning. He also has congestion and cough. He has appt this afternoon with a pcp.

## 2024-03-18 ENCOUNTER — OFFICE VISIT (OUTPATIENT)
Dept: SURGERY | Age: 24
End: 2024-03-18
Payer: COMMERCIAL

## 2024-03-18 VITALS — HEIGHT: 69 IN | WEIGHT: 137 LBS | BODY MASS INDEX: 20.29 KG/M2

## 2024-03-18 DIAGNOSIS — L72.3 SEBACEOUS CYST: Primary | ICD-10-CM

## 2024-03-18 PROCEDURE — 1036F TOBACCO NON-USER: CPT | Performed by: SURGERY

## 2024-03-18 PROCEDURE — G8420 CALC BMI NORM PARAMETERS: HCPCS | Performed by: SURGERY

## 2024-03-18 PROCEDURE — G8484 FLU IMMUNIZE NO ADMIN: HCPCS | Performed by: SURGERY

## 2024-03-18 PROCEDURE — G8427 DOCREV CUR MEDS BY ELIG CLIN: HCPCS | Performed by: SURGERY

## 2024-03-18 PROCEDURE — 99213 OFFICE O/P EST LOW 20 MIN: CPT | Performed by: SURGERY

## 2024-03-18 RX ORDER — SODIUM CHLORIDE 0.9 % (FLUSH) 0.9 %
5-40 SYRINGE (ML) INJECTION PRN
OUTPATIENT
Start: 2024-03-18

## 2024-03-18 RX ORDER — SODIUM CHLORIDE 0.9 % (FLUSH) 0.9 %
5-40 SYRINGE (ML) INJECTION EVERY 12 HOURS SCHEDULED
OUTPATIENT
Start: 2024-03-18

## 2024-03-18 RX ORDER — SODIUM CHLORIDE 9 MG/ML
INJECTION, SOLUTION INTRAVENOUS PRN
OUTPATIENT
Start: 2024-03-18

## 2024-04-04 ENCOUNTER — HOSPITAL ENCOUNTER (OUTPATIENT)
Dept: PULMONOLOGY | Age: 24
Discharge: HOME | End: 2024-04-04
Attending: INTERNAL MEDICINE
Payer: COMMERCIAL

## 2024-04-04 ENCOUNTER — HOSPITAL ENCOUNTER (OUTPATIENT)
Dept: NON INVASIVE DIAGNOSTICS | Age: 24
Discharge: HOME | End: 2024-04-04
Attending: INTERNAL MEDICINE
Payer: COMMERCIAL

## 2024-04-04 VITALS — OXYGEN SATURATION: 98 %

## 2024-04-04 DIAGNOSIS — R06.02 SHORTNESS OF BREATH: ICD-10-CM

## 2024-04-04 LAB
DLCO %PRED: 79 %
DLCO PRED: NORMAL
DLCO/VA %PRED: NORMAL
DLCO/VA PRED: NORMAL
DLCO/VA: NORMAL
DLCO: NORMAL
EXPIRATORY TIME-POST: NORMAL
EXPIRATORY TIME: NORMAL
FEF 25-75 %CHNG: NORMAL
FEF 25-75 POST %PRED: NORMAL
FEF 25-75% %PRED-PRE: NORMAL
FEF 25-75% PRED: NORMAL
FEF 25-75-POST: NORMAL
FEF 25-75-PRE: NORMAL
FEV1 %PRED-POST: NORMAL
FEV1 %PRED-PRE: 90 %
FEV1 PRED: NORMAL
FEV1-POST: NORMAL
FEV1-PRE: NORMAL
FEV1/FVC %PRED-POST: NORMAL
FEV1/FVC %PRED-PRE: NORMAL
FEV1/FVC PRED: NORMAL
FEV1/FVC-POST: NORMAL
FEV1/FVC-PRE: 82 %
FVC %PRED-POST: NORMAL
FVC %PRED-PRE: NORMAL
FVC PRED: NORMAL
FVC-POST: NORMAL
FVC-PRE: NORMAL
GAW %PRED: NORMAL
GAW PRED: NORMAL
GAW: NORMAL
IC PRE %PRED: NORMAL
IC PRED: NORMAL
IC: NORMAL
MEP: NORMAL
MIP: NORMAL
MVV %PRED-PRE: NORMAL
MVV PRED: NORMAL
MVV-PRE: NORMAL
PEF %PRED-POST: NORMAL
PEF %PRED-PRE: NORMAL
PEF PRED: NORMAL
PEF%CHNG: NORMAL
PEF-POST: NORMAL
PEF-PRE: NORMAL
RAW %PRED: NORMAL
RAW PRED: NORMAL
RAW: NORMAL
RV PRE %PRED: NORMAL
RV PRED: NORMAL
RV: NORMAL
SVC %PRED: NORMAL
SVC PRED: NORMAL
SVC: NORMAL
TLC PRE %PRED: 95 %
TLC PRED: NORMAL
TLC: NORMAL
VA %PRED: NORMAL
VA PRED: NORMAL
VA: NORMAL
VTG %PRED: NORMAL
VTG PRED: NORMAL
VTG: NORMAL

## 2024-04-04 PROCEDURE — 94010 BREATHING CAPACITY TEST: CPT

## 2024-04-04 PROCEDURE — 94726 PLETHYSMOGRAPHY LUNG VOLUMES: CPT

## 2024-04-04 PROCEDURE — 93306 TTE W/DOPPLER COMPLETE: CPT

## 2024-04-04 PROCEDURE — 94729 DIFFUSING CAPACITY: CPT

## 2024-04-04 PROCEDURE — 94760 N-INVAS EAR/PLS OXIMETRY 1: CPT

## 2024-04-04 ASSESSMENT — PULMONARY FUNCTION TESTS
FEV1_PERCENT_PREDICTED_PRE: 90
FEV1/FVC_PRE: 82

## 2024-04-05 ENCOUNTER — TELEPHONE (OUTPATIENT)
Dept: CARDIOLOGY CLINIC | Age: 24
End: 2024-04-05

## 2024-04-05 NOTE — TELEPHONE ENCOUNTER
----- Message from Efrem Gentile MD sent at 4/4/2024  4:49 PM EDT -----  The test is normal. Please call the patient and inform them of the normal result.

## 2024-04-06 NOTE — PROCEDURES
[address] [address]                           PULMONARY FUNCTION      PATIENT NAME: CORDELL PEREZ            : 2000  MED REC NO: [id number]                      ROOM:   ACCOUNT NO: [id number]                       ADMIT DATE: 2024  PROVIDER: Thang Ayala MD      DATE OF PROCEDURE:      INDICATION:  Dyspnea.    FINDINGS:    1. Spirometry:  The FVC is 91% of predicted.  The FEV1 is 4.05 L, which is 90% of predicted.  The FEV1/FVC ratio is 0.82.  2. Lung volumes by plethysmography:  The total lung capacity is 6.37 L, which is 95% of predicted.  3. The diffusion capacity for carbon monoxide is 79% of predicted.  4. The flow volume loop is within normal limits.    IMPRESSION:  This patient has normal spirometry with no obstructive defects.  The lung volumes are within normal limits.  Diffusion capacity of carbon monoxide is within normal limits.          THANG AYALA MD      D:  2024 20:51:58     T:  2024 11:38:00     /[facility]  Job #:  [id number]     Doc#:  [id number]

## 2024-04-08 ENCOUNTER — TELEPHONE (OUTPATIENT)
Dept: CARDIOLOGY CLINIC | Age: 24
End: 2024-04-08

## 2024-04-08 NOTE — TELEPHONE ENCOUNTER
----- Message from Efrem Gentile MD sent at 4/8/2024  7:54 AM EDT -----  The PFT test is normal. Please call the patient and inform them of the normal result.

## 2024-04-25 ENCOUNTER — HOSPITAL ENCOUNTER (OUTPATIENT)
Dept: NON INVASIVE DIAGNOSTICS | Age: 24
Discharge: HOME | End: 2024-04-25
Payer: COMMERCIAL

## 2024-04-25 ENCOUNTER — APPOINTMENT (OUTPATIENT)
Dept: NON INVASIVE DIAGNOSTICS | Age: 24
End: 2024-04-25
Attending: INTERNAL MEDICINE
Payer: COMMERCIAL

## 2024-04-25 ENCOUNTER — APPOINTMENT (OUTPATIENT)
Dept: PULMONOLOGY | Age: 24
End: 2024-04-25
Attending: INTERNAL MEDICINE
Payer: COMMERCIAL

## 2024-04-25 DIAGNOSIS — R07.89 ATYPICAL CHEST PAIN: ICD-10-CM

## 2024-04-25 PROCEDURE — 93351 STRESS TTE COMPLETE: CPT

## 2024-04-25 NOTE — PROGRESS NOTES
Stress echocardiogram complete. Discharge instructions given to pt. Pt verbalizes understanding to discharge instructions.

## 2024-05-01 ENCOUNTER — TELEPHONE (OUTPATIENT)
Dept: CARDIOLOGY CLINIC | Age: 24
End: 2024-05-01

## 2024-05-01 DIAGNOSIS — R94.39 ABNORMAL STRESS ECHOCARDIOGRAM: Primary | ICD-10-CM

## 2024-05-01 DIAGNOSIS — Z01.818 PRE-OP TESTING: ICD-10-CM

## 2024-05-01 NOTE — TELEPHONE ENCOUNTER
----- Message from Brian Castaneda DO sent at 4/29/2024  4:19 PM EDT -----  Patient's stress test was reviewed.  Stress echocardiogram images were obtained with heart rates that submaximal stress.  There is questionable hypokinesis of the basal inferolateral wall.  Given findings, will recommend proceeding with a cardiac CTA to further evaluate his coronary anatomy.  He can take metoprolol succinate 50 mg the morning of his study, and additional IV metoprolol or IV diltiazem can be given to help achieve goal heart rate <60 bpm for study.    Attempted to call patient to review results, but got voicemail.  Left message for him to call back to go over results.

## 2024-05-01 NOTE — TELEPHONE ENCOUNTER
Called and spoke with patient mother, мария per HIPAA. Relayed [facility] results and instructions. 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 Patient has not been prescribed PRN anxiety medication in past. She expresses worry in relation to being able to complete testing.

## 2024-05-02 NOTE — TELEPHONE ENCOUNTER
We can provide him with an anxiolytic to take at the time of his scan if he is willing to proceed.  Thanks.

## 2024-05-03 RX ORDER — METOPROLOL SUCCINATE 50 MG/1
TABLET, EXTENDED RELEASE ORAL
Qty: 1 TABLET | Refills: 0 | Status: SHIPPED | OUTPATIENT
Start: 2024-05-03

## 2024-05-03 NOTE — TELEPHONE ENCOUNTER
Attempted to reach no answer. Will relay [facility] message and see if wishes to proceed. Will need to print script for [facility] to sign and have patient  at [facility].

## 2024-05-03 NOTE — TELEPHONE ENCOUNTER
Pt mom returned phone call.  He is willing to take a medication and proceed with the procedure.  I told him [facility] will call and let her know when script is ready to be picked up.

## 2024-05-03 NOTE — TELEPHONE ENCOUNTER
Called and spoke with patient mother. Provided CS phone number to schedule testing. She VU have patient take metoprolol succinate morning of testing. Verified pharmacy.    [facility] please advise on anxiety medication and patient mother VU will need to be picked up at [facility].

## 2024-05-06 RX ORDER — LORAZEPAM 0.5 MG/1
TABLET ORAL
Qty: 1 TABLET | Refills: 0 | Status: SHIPPED | OUTPATIENT
Start: 2024-05-06 | End: 2024-05-06

## 2024-05-06 RX ORDER — LORAZEPAM 0.5 MG/1
TABLET ORAL
Qty: 1 TABLET | Refills: 0 | Status: SHIPPED | OUTPATIENT
Start: 2024-05-06 | End: 2024-09-01

## 2024-05-06 NOTE — TELEPHONE ENCOUNTER
Spoke with [facility] verbal order for ativan 0.5 mg tablet 30 minutes prior to cardiac testing. Called and left , called and spoke with patient mother notified script Will be able to  05/08/24 at [place]. She OSMAR.

## 2024-07-09 ENCOUNTER — TELEPHONE (OUTPATIENT)
Dept: FAMILY MEDICINE CLINIC | Age: 24
End: 2024-07-09

## 2024-08-16 ENCOUNTER — TELEPHONE (OUTPATIENT)
Dept: FAMILY MEDICINE CLINIC | Age: 24
End: 2024-08-16

## 2024-08-16 NOTE — TELEPHONE ENCOUNTER
Left message for patient to return phone call to see if he wishes to still establish care at our office.     Please help to schedule with Dr. Newberry or Dr. London as a NP.

## 2025-01-26 ENCOUNTER — HOSPITAL ENCOUNTER (EMERGENCY)
Age: 25
Discharge: HOME | End: 2025-01-26

## 2025-01-26 VITALS
HEIGHT: 69 IN | RESPIRATION RATE: 16 BRPM | DIASTOLIC BLOOD PRESSURE: 91 MMHG | BODY MASS INDEX: 20.71 KG/M2 | OXYGEN SATURATION: 99 % | WEIGHT: 139.8 LBS | HEART RATE: 84 BPM | TEMPERATURE: 98.1 F | SYSTOLIC BLOOD PRESSURE: 137 MMHG

## 2025-01-26 DIAGNOSIS — R36.9 DRAINAGE FROM PENIS: Primary | ICD-10-CM

## 2025-01-26 LAB
BILIRUB UR QL STRIP.AUTO: NEGATIVE
CLARITY UR: CLEAR
COLOR UR: YELLOW
GLUCOSE UR STRIP.AUTO-MCNC: NEGATIVE MG/DL
HGB UR QL STRIP.AUTO: ABNORMAL
KETONES UR STRIP.AUTO-MCNC: NEGATIVE MG/DL
LEUKOCYTE ESTERASE UR QL STRIP.AUTO: NEGATIVE
NITRITE UR QL STRIP.AUTO: NEGATIVE
PH UR STRIP.AUTO: 6 [PH] (ref 5–8)
PROT UR STRIP.AUTO-MCNC: NEGATIVE MG/DL
RBC #/AREA URNS HPF: NORMAL /HPF (ref 0–4)
SP GR UR STRIP.AUTO: >=1.03 (ref 1–1.03)
UA COMPLETE W REFLEX CULTURE PNL UR: ABNORMAL
UA DIPSTICK W REFLEX MICRO PNL UR: YES
URN SPEC COLLECT METH UR: ABNORMAL
UROBILINOGEN UR STRIP-ACNC: 0.2 E.U./DL
WBC #/AREA URNS HPF: NORMAL /HPF (ref 0–5)

## 2025-01-26 PROCEDURE — 99284 EMERGENCY DEPT VISIT MOD MDM: CPT

## 2025-01-26 PROCEDURE — 87491 CHLMYD TRACH DNA AMP PROBE: CPT

## 2025-01-26 PROCEDURE — 87591 N.GONORRHOEAE DNA AMP PROB: CPT

## 2025-01-26 PROCEDURE — 6360000002 HC RX W HCPCS: Performed by: NURSE PRACTITIONER

## 2025-01-26 PROCEDURE — 81001 URINALYSIS AUTO W/SCOPE: CPT

## 2025-01-26 PROCEDURE — 6370000000 HC RX 637 (ALT 250 FOR IP): Performed by: NURSE PRACTITIONER

## 2025-01-26 PROCEDURE — 96372 THER/PROPH/DIAG INJ SC/IM: CPT

## 2025-01-26 RX ORDER — DOXYCYCLINE HYCLATE 100 MG
100 TABLET ORAL 2 TIMES DAILY
Qty: 20 TABLET | Refills: 0 | Status: SHIPPED | OUTPATIENT
Start: 2025-01-26 | End: 2025-02-05

## 2025-01-26 RX ORDER — LIDOCAINE HYDROCHLORIDE 10 MG/ML
INJECTION, SOLUTION INFILTRATION; PERINEURAL
Status: DISCONTINUED
Start: 2025-01-26 | End: 2025-01-26 | Stop reason: HOSPADM

## 2025-01-26 RX ORDER — CEFTRIAXONE 1 G/1
500 INJECTION, POWDER, FOR SOLUTION INTRAMUSCULAR; INTRAVENOUS ONCE
Status: COMPLETED | OUTPATIENT
Start: 2025-01-26 | End: 2025-01-26

## 2025-01-26 RX ORDER — DOXYCYCLINE HYCLATE 100 MG
100 TABLET ORAL ONCE
Status: COMPLETED | OUTPATIENT
Start: 2025-01-26 | End: 2025-01-26

## 2025-01-26 RX ADMIN — CEFTRIAXONE 500 MG: 1 INJECTION, POWDER, FOR SOLUTION INTRAMUSCULAR; INTRAVENOUS at 14:50

## 2025-01-26 RX ADMIN — DOXYCYCLINE HYCLATE 100 MG: 100 TABLET, COATED ORAL at 14:50

## 2025-01-26 ASSESSMENT — PAIN - FUNCTIONAL ASSESSMENT: PAIN_FUNCTIONAL_ASSESSMENT: NONE - DENIES PAIN

## 2025-01-26 ASSESSMENT — LIFESTYLE VARIABLES
HOW MANY STANDARD DRINKS CONTAINING ALCOHOL DO YOU HAVE ON A TYPICAL DAY: PATIENT DOES NOT DRINK
HOW OFTEN DO YOU HAVE A DRINK CONTAINING ALCOHOL: NEVER

## 2025-01-26 NOTE — ED PROVIDER NOTES
[facility] EMERGENCY DEPARTMENT  EMERGENCY DEPARTMENT ENCOUNTER        Pt Name: Dony Trevizo  MRN: [id number]  Birthdate 2000  Date of evaluation: 1/26/2025  Provider: MARIFER Eugene CNP  PCP: No primary care provider on file.  Note Started: 2:33 PM EST 1/26/25      GERTRUDE. I have evaluated this patient.        CHIEF COMPLAINT       Chief Complaint   Patient presents with    Exposure to STD     Wants to be checked for gonorrhea and chlamydia. Pt states he is having some discharge.        HISTORY OF PRESENT ILLNESS: 1 or more Elements     History From: Patient     Limitations to history : None    Social Determinants Significantly Affecting Health : None    Chief Complaint: Penile drainage     Dony Trevizo is a 24 y.o. male who presents to the emergency department today with symptoms of penile drainage.  Patient states that he started with symptoms yesterday.  States that this morning woke up with crusted drainage at the tip of penis.  Denies any scrotal or penile wounds.  No ulcerations or lesions.  No rash.  Denies any testicular pain or scrotal pain or swelling.  No abdominal pain.  Denies fever or joint swelling.  States he has had some risky sexual behaviors recently.    Nursing Notes were all reviewed and agreed with or any disagreements were addressed in the HPI.    REVIEW OF SYSTEMS :      Review of Systems    Positives and Pertinent negatives as per HPI.     SURGICAL HISTORY   History reviewed. No pertinent surgical history.    CURRENTMEDICATIONS       Discharge Medication List as of 1/26/2025  2:52 PM        CONTINUE these medications which have NOT CHANGED    Details   metoprolol succinate (TOPROL XL) 50 MG extended release tablet Take 1 tablet by mouth morning of cardiac CTA imaging., Disp-1 tablet, R-0Normal      ADVAIR DISKUS 250-50 MCG/DOSE AEPB Inhale 1 puff into the lungs 2 times daily, Disp-60 each,R-5,DAWNormal      albuterol sulfate HFA (PROVENTIL HFA) 108 (90 Base)

## 2025-01-28 LAB
C TRACH DNA UR QL NAA+PROBE: POSITIVE
N GONORRHOEA DNA UR QL NAA+PROBE: NEGATIVE